# Patient Record
Sex: FEMALE | Race: BLACK OR AFRICAN AMERICAN | NOT HISPANIC OR LATINO | Employment: OTHER | URBAN - METROPOLITAN AREA
[De-identification: names, ages, dates, MRNs, and addresses within clinical notes are randomized per-mention and may not be internally consistent; named-entity substitution may affect disease eponyms.]

---

## 2023-12-01 ENCOUNTER — HOSPITAL ENCOUNTER (EMERGENCY)
Facility: HOSPITAL | Age: 63
Discharge: HOME | End: 2023-12-01
Payer: COMMERCIAL

## 2023-12-01 ENCOUNTER — APPOINTMENT (OUTPATIENT)
Dept: RADIOLOGY | Facility: HOSPITAL | Age: 63
End: 2023-12-01
Payer: COMMERCIAL

## 2023-12-01 VITALS
RESPIRATION RATE: 16 BRPM | BODY MASS INDEX: 40.72 KG/M2 | HEIGHT: 59 IN | OXYGEN SATURATION: 96 % | TEMPERATURE: 97.7 F | WEIGHT: 202 LBS | DIASTOLIC BLOOD PRESSURE: 67 MMHG | SYSTOLIC BLOOD PRESSURE: 135 MMHG | HEART RATE: 56 BPM

## 2023-12-01 DIAGNOSIS — R10.2 PELVIC PAIN: Primary | ICD-10-CM

## 2023-12-01 DIAGNOSIS — D21.9 FIBROID: ICD-10-CM

## 2023-12-01 LAB
ALBUMIN SERPL BCP-MCNC: 4.1 G/DL (ref 3.4–5)
ALP SERPL-CCNC: 85 U/L (ref 33–136)
ALT SERPL W P-5'-P-CCNC: 21 U/L (ref 7–45)
ANION GAP SERPL CALC-SCNC: 13 MMOL/L (ref 10–20)
APPEARANCE UR: CLEAR
AST SERPL W P-5'-P-CCNC: 18 U/L (ref 9–39)
BASOPHILS # BLD AUTO: 0.06 X10*3/UL (ref 0–0.1)
BASOPHILS NFR BLD AUTO: 0.7 %
BILIRUB SERPL-MCNC: 0.3 MG/DL (ref 0–1.2)
BILIRUB UR STRIP.AUTO-MCNC: NEGATIVE MG/DL
BUN SERPL-MCNC: 12 MG/DL (ref 6–23)
CALCIUM SERPL-MCNC: 9.4 MG/DL (ref 8.6–10.6)
CHLORIDE SERPL-SCNC: 102 MMOL/L (ref 98–107)
CO2 SERPL-SCNC: 27 MMOL/L (ref 21–32)
COLOR UR: YELLOW
CREAT SERPL-MCNC: 0.87 MG/DL (ref 0.5–1.05)
EOSINOPHIL # BLD AUTO: 0.46 X10*3/UL (ref 0–0.7)
EOSINOPHIL NFR BLD AUTO: 5.1 %
ERYTHROCYTE [DISTWIDTH] IN BLOOD BY AUTOMATED COUNT: 13.2 % (ref 11.5–14.5)
GFR SERPL CREATININE-BSD FRML MDRD: 75 ML/MIN/1.73M*2
GLUCOSE SERPL-MCNC: 160 MG/DL (ref 74–99)
GLUCOSE UR STRIP.AUTO-MCNC: NEGATIVE MG/DL
HCT VFR BLD AUTO: 43.3 % (ref 36–46)
HGB BLD-MCNC: 14 G/DL (ref 12–16)
HOLD SPECIMEN: NORMAL
IMM GRANULOCYTES # BLD AUTO: 0.02 X10*3/UL (ref 0–0.7)
IMM GRANULOCYTES NFR BLD AUTO: 0.2 % (ref 0–0.9)
KETONES UR STRIP.AUTO-MCNC: NEGATIVE MG/DL
LEUKOCYTE ESTERASE UR QL STRIP.AUTO: NEGATIVE
LYMPHOCYTES # BLD AUTO: 2.67 X10*3/UL (ref 1.2–4.8)
LYMPHOCYTES NFR BLD AUTO: 29.7 %
MCH RBC QN AUTO: 30.2 PG (ref 26–34)
MCHC RBC AUTO-ENTMCNC: 32.3 G/DL (ref 32–36)
MCV RBC AUTO: 93 FL (ref 80–100)
MONOCYTES # BLD AUTO: 0.57 X10*3/UL (ref 0.1–1)
MONOCYTES NFR BLD AUTO: 6.3 %
NEUTROPHILS # BLD AUTO: 5.2 X10*3/UL (ref 1.2–7.7)
NEUTROPHILS NFR BLD AUTO: 58 %
NITRITE UR QL STRIP.AUTO: NEGATIVE
NRBC BLD-RTO: 0 /100 WBCS (ref 0–0)
PH UR STRIP.AUTO: 5 [PH]
PLATELET # BLD AUTO: 247 X10*3/UL (ref 150–450)
POTASSIUM SERPL-SCNC: 4 MMOL/L (ref 3.5–5.3)
PROT SERPL-MCNC: 7.5 G/DL (ref 6.4–8.2)
PROT UR STRIP.AUTO-MCNC: NEGATIVE MG/DL
RBC # BLD AUTO: 4.64 X10*6/UL (ref 4–5.2)
RBC # UR STRIP.AUTO: NEGATIVE /UL
SODIUM SERPL-SCNC: 138 MMOL/L (ref 136–145)
SP GR UR STRIP.AUTO: 1.01
UROBILINOGEN UR STRIP.AUTO-MCNC: <2 MG/DL
WBC # BLD AUTO: 9 X10*3/UL (ref 4.4–11.3)

## 2023-12-01 PROCEDURE — 76856 US EXAM PELVIC COMPLETE: CPT | Performed by: RADIOLOGY

## 2023-12-01 PROCEDURE — 85025 COMPLETE CBC W/AUTO DIFF WBC: CPT

## 2023-12-01 PROCEDURE — 36415 COLL VENOUS BLD VENIPUNCTURE: CPT

## 2023-12-01 PROCEDURE — 2500000004 HC RX 250 GENERAL PHARMACY W/ HCPCS (ALT 636 FOR OP/ED)

## 2023-12-01 PROCEDURE — 99284 EMERGENCY DEPT VISIT MOD MDM: CPT

## 2023-12-01 PROCEDURE — 81003 URINALYSIS AUTO W/O SCOPE: CPT

## 2023-12-01 PROCEDURE — 80053 COMPREHEN METABOLIC PANEL: CPT

## 2023-12-01 PROCEDURE — 76856 US EXAM PELVIC COMPLETE: CPT

## 2023-12-01 PROCEDURE — 76830 TRANSVAGINAL US NON-OB: CPT | Performed by: RADIOLOGY

## 2023-12-01 PROCEDURE — 96375 TX/PRO/DX INJ NEW DRUG ADDON: CPT

## 2023-12-01 PROCEDURE — 96374 THER/PROPH/DIAG INJ IV PUSH: CPT

## 2023-12-01 RX ORDER — ONDANSETRON 4 MG/1
4 TABLET, ORALLY DISINTEGRATING ORAL EVERY 8 HOURS PRN
Qty: 21 TABLET | Refills: 0 | Status: SHIPPED | OUTPATIENT
Start: 2023-12-01

## 2023-12-01 RX ORDER — KETOROLAC TROMETHAMINE 15 MG/ML
15 INJECTION, SOLUTION INTRAMUSCULAR; INTRAVENOUS ONCE
Status: COMPLETED | OUTPATIENT
Start: 2023-12-01 | End: 2023-12-01

## 2023-12-01 RX ORDER — KETOROLAC TROMETHAMINE 10 MG/1
10 TABLET, FILM COATED ORAL EVERY 6 HOURS PRN
Qty: 20 TABLET | Refills: 0 | Status: SHIPPED | OUTPATIENT
Start: 2023-12-01 | End: 2023-12-06

## 2023-12-01 RX ORDER — ONDANSETRON HYDROCHLORIDE 2 MG/ML
4 INJECTION, SOLUTION INTRAVENOUS ONCE
Status: COMPLETED | OUTPATIENT
Start: 2023-12-01 | End: 2023-12-01

## 2023-12-01 RX ADMIN — ONDANSETRON 4 MG: 2 INJECTION INTRAMUSCULAR; INTRAVENOUS at 13:17

## 2023-12-01 RX ADMIN — KETOROLAC TROMETHAMINE 15 MG: 15 INJECTION INTRAMUSCULAR; INTRAVENOUS at 13:15

## 2023-12-01 ASSESSMENT — PAIN SCALES - GENERAL
PAINLEVEL_OUTOF10: 10 - WORST POSSIBLE PAIN
PAINLEVEL_OUTOF10: 10 - WORST POSSIBLE PAIN

## 2023-12-01 ASSESSMENT — PAIN DESCRIPTION - ONSET: ONSET: ONGOING

## 2023-12-01 ASSESSMENT — PAIN - FUNCTIONAL ASSESSMENT
PAIN_FUNCTIONAL_ASSESSMENT: 0-10
PAIN_FUNCTIONAL_ASSESSMENT: 0-10

## 2023-12-01 ASSESSMENT — PAIN DESCRIPTION - PROGRESSION: CLINICAL_PROGRESSION: NOT CHANGED

## 2023-12-01 ASSESSMENT — PAIN DESCRIPTION - FREQUENCY: FREQUENCY: CONSTANT/CONTINUOUS

## 2023-12-01 ASSESSMENT — PAIN DESCRIPTION - DESCRIPTORS: DESCRIPTORS: ACHING

## 2023-12-01 ASSESSMENT — COLUMBIA-SUICIDE SEVERITY RATING SCALE - C-SSRS
2. HAVE YOU ACTUALLY HAD ANY THOUGHTS OF KILLING YOURSELF?: NO
1. IN THE PAST MONTH, HAVE YOU WISHED YOU WERE DEAD OR WISHED YOU COULD GO TO SLEEP AND NOT WAKE UP?: NO
6. HAVE YOU EVER DONE ANYTHING, STARTED TO DO ANYTHING, OR PREPARED TO DO ANYTHING TO END YOUR LIFE?: NO

## 2023-12-01 ASSESSMENT — PAIN DESCRIPTION - PAIN TYPE: TYPE: ACUTE PAIN

## 2023-12-01 ASSESSMENT — PAIN DESCRIPTION - LOCATION: LOCATION: ABDOMEN

## 2023-12-01 NOTE — PROGRESS NOTES
Emergency Medicine Transition of Care Note.    I received Augustina Crump in signout from previous team.  Please see the previous ED provider note for all HPI, PE and MDM up to the time of signout at 1300. This is in addition to the primary record.    In brief Augustina Crump is an 63 y.o. female presenting for pelvic pain at the time of signout we were awaiting: Laboratory results, transvaginal ultrasound results and reevaluation.  On my reevaluation she was feeling improved following Zofran and Toradol.  Laboratory studies grossly unremarkable, specifically no evidence of UTI and patient was not anemic with her hemoglobin being 14.0.  Ultrasound did show an enlarged uterus with fibroids present as well as a thickened endometrium.  Patient does have an appointment scheduled with OB/GYN for a biopsy later this month.  She was informed of these results.  At this time I discussed with the patient that the biopsy would be the next step as there was no other acute findings noted on her imaging and laboratory results.  She was agreeable to this plan.  She was given prescriptions for Toradol and Zofran for her symptoms at home and was given signs and symptoms to return to the ED with.  She was advised to follow-up with her OB/GYN.    Diagnoses as of 12/01/23 1558   Pelvic pain   Fibroid       Labs Reviewed   COMPREHENSIVE METABOLIC PANEL - Abnormal       Result Value    Glucose 160 (*)     Sodium 138      Potassium 4.0      Chloride 102      Bicarbonate 27      Anion Gap 13      Urea Nitrogen 12      Creatinine 0.87      eGFR 75      Calcium 9.4      Albumin 4.1      Alkaline Phosphatase 85      Total Protein 7.5      AST 18      Bilirubin, Total 0.3      ALT 21     URINALYSIS WITH REFLEX MICROSCOPIC AND CULTURE - Normal    Color, Urine Yellow      Appearance, Urine Clear      Specific Gravity, Urine 1.014      pH, Urine 5.0      Protein, Urine NEGATIVE      Glucose, Urine NEGATIVE      Blood, Urine NEGATIVE       Ketones, Urine NEGATIVE      Bilirubin, Urine NEGATIVE      Urobilinogen, Urine <2.0      Nitrite, Urine NEGATIVE      Leukocyte Esterase, Urine NEGATIVE     URINALYSIS WITH REFLEX MICROSCOPIC AND CULTURE    Narrative:     The following orders were created for panel order Urinalysis with Reflex Microscopic and Culture.  Procedure                               Abnormality         Status                     ---------                               -----------         ------                     Urinalysis with Reflex M...[[id number]]  Normal              Final result               Extra Urine Gray Tube[[id number]]                            Final result                 Please view results for these tests on the individual orders.   CBC WITH AUTO DIFFERENTIAL    WBC 9.0      nRBC 0.0      RBC 4.64      Hemoglobin 14.0      Hematocrit 43.3      MCV 93      MCH 30.2      MCHC 32.3      RDW 13.2      Platelets 247      Neutrophils % 58.0      Immature Granulocytes %, Automated 0.2      Lymphocytes % 29.7      Monocytes % 6.3      Eosinophils % 5.1      Basophils % 0.7      Neutrophils Absolute 5.20      Immature Granulocytes Absolute, Automated 0.02      Lymphocytes Absolute 2.67      Monocytes Absolute 0.57      Eosinophils Absolute 0.46      Basophils Absolute 0.06     GREEN TOP    Extra Tube Hold for add-ons.     EXTRA URINE GRAY TUBE    Extra Tube Hold for add-ons.         US PELVIS TRANSABDOMINAL WITH TRANSVAGINAL   Final Result   1. Enlarged, leiomyomatous uterus including both intramural and   subserosal leiomyomas measuring up to 3.4 cm.   2. Small cystic lesions scattered throughout the uterine myometrium,   which likely represents adenomyosis.   3. Abnormally thickened endometrium given postmenopausal status.   Recommend further evaluation with direct visualization via   hysteroscopy and/or tissue sampling.        I personally reviewed the images/study, and I agree with the findings   as stated above. This study was  interpreted at [place], [place].        MACRO:   None        Signed by: Johnson De La Fuente 12/1/2023 3:31 PM   Dictation workstation:   [id number]            Medical Decision Making  Amount and/or Complexity of Data Reviewed  Labs: ordered.  Radiology: ordered and independent interpretation performed.    Risk  Prescription drug management.        Final diagnoses:   [R10.2] Pelvic pain   [D21.9] Fibroid           Procedure  Procedures    Loretta Dennison PA-C

## 2023-12-01 NOTE — ED TRIAGE NOTES
Pt has history of fibroid tumors. Is scheduled to have them removed at the end of December. Pt reports she has had increase in pain and vaginal bleeding.

## 2023-12-01 NOTE — ED PROVIDER NOTES
HPI   Chief Complaint   Patient presents with    Abdominal Pain       Patient is a 63-year-old female with a history of hypertension, diabetes, as well as recently diagnosed fibroids.  She presents to the emergency department with lower abdominal pain.  Patient states that about a month ago she started noticing she was having a period, which was abnormal, went to primary care provider/OB/GYN who diagnosed her with fibroids.  Patient states there was some concern of cancer, so she is waiting on biopsy results.  Has a follow-up at the end of December.  States that the bleeding has subsided and she is no longer experiencing any, but 2 weeks ago she started dealing with this lower abdominal pain, which does seem mainly left-sided, but does wrap around the lower abdomen.  Patient endorses some new or nausea, but no vomiting.  Endorses normal bowel movements and no dysuria.  No abdominal history surgically.  Denies any fevers, chills, weakness, dizziness, chest pain, shortness of breath, vaginal discharge, hematuria, diarrhea.                          [place] Coma Scale Score: 15                  Patient History   No past medical history on file.  No past surgical history on file.  No family history on file.  Social History     Tobacco Use    Smoking status: Not on file    Smokeless tobacco: Not on file   Substance Use Topics    Alcohol use: Not on file    Drug use: Not on file       Physical Exam   ED Triage Vitals [12/01/23 1222]   Temp Heart Rate Resp BP   36.5 °C (97.7 °F) 56 16 135/67      SpO2 Temp Source Heart Rate Source Patient Position   96 % Tympanic -- --      BP Location FiO2 (%)     Right arm --       Physical Exam  Vitals and nursing note reviewed.   Constitutional:       General: She is not in acute distress.     Appearance: She is well-developed.   HENT:      Head: Normocephalic and atraumatic.   Eyes:      Conjunctiva/sclera: Conjunctivae normal.   Cardiovascular:      Rate and Rhythm: Normal rate and  regular rhythm.      Heart sounds: No murmur heard.  Pulmonary:      Effort: Pulmonary effort is normal. No respiratory distress.      Breath sounds: Normal breath sounds.   Abdominal:      Palpations: Abdomen is soft.      Tenderness: There is abdominal tenderness in the suprapubic area and left lower quadrant. There is no right CVA tenderness, left CVA tenderness, guarding or rebound. Negative signs include Rovsing's sign, McBurney's sign and psoas sign.      Hernia: No hernia is present.   Musculoskeletal:         General: No swelling.      Cervical back: Neck supple.   Skin:     General: Skin is warm and dry.      Capillary Refill: Capillary refill takes less than 2 seconds.   Neurological:      Mental Status: She is alert.   Psychiatric:         Mood and Affect: Mood normal.         ED Course & MDM        Medical Decision Making  Patient presents with lower abdominal pain for approximately 2 weeks.  Does have a GYN history, with concerns of possible fibroids versus tumors.  On exam patient is very well-appearing, afebrile, hemodynamically stable.  Patient endorses nausea but no vomiting.  No surgical abdominal history.  Patient's abdomen is slightly tender to palpation in the lower abdominal regions with most of it in the left lower quadrant as well as the suprapubic region.  No right lower quadrant tenderness is appreciated.  Patient's abdomen is soft, low suspicion for any acute surgical abdomen.  Patient looks very well, and has no guarding.  Patient eating and drinking without difficulties.  Patient denies any vaginal discharge or vaginal bleeding.  Due to my history and physical exam, I do want to rule out possible ovarian torsion as the patient endorses nausea and left-sided pain although with the symptoms going on 2 weeks, have lower suspicion.  Will get a transvaginal ultrasound to also investigate possible fibroids or worsening pathology from previous.  Patient is afebrile and has no right lower  quadrant/McBurney's tenderness, so lower suspicion for appendicitis.  Patient is slightly tender to the left lower quadrant as well, so do not want to discount diverticulitis at this time.  The plan will be transvaginal ultrasound and also get CBC and CMP and urinalysis.  However, with no CVA tenderness, and no real urinary symptoms, low suspicion for pyelonephritis.  Treated patient's pain with Toradol.  If transvaginal ultrasound is unremarkable and patient's pain persists, will reevaluate for possible CT to look for abdominal pathology.    Of note patient did asked to be admitted for hysterectomy, but I explained to her that she would have to continue to follow-up with outpatient OB/GYN as this is not a common practice to admit someone for from the emergency department unless we do find an emergent reason for admitting the patient. However, we will wait to make a decision on that obviously until work up is complete.     At the end of my shift the patient was still awaiting her blood work as well as imaging, due to this the patient was signed out to the oncoming physician assistant Loretta Dennison for reassessment and disposition.        Procedure  Procedures     Brent Moore PA-C  12/01/23 8471

## 2023-12-13 LAB
HOLD SPECIMEN: NORMAL

## 2024-04-05 ENCOUNTER — OFFICE VISIT (OUTPATIENT)
Dept: ORTHOPEDIC SURGERY | Facility: HOSPITAL | Age: 64
End: 2024-04-05
Payer: COMMERCIAL

## 2024-04-05 ENCOUNTER — HOSPITAL ENCOUNTER (OUTPATIENT)
Dept: RADIOLOGY | Facility: HOSPITAL | Age: 64
Discharge: HOME | End: 2024-04-05
Payer: COMMERCIAL

## 2024-04-05 VITALS — WEIGHT: 202 LBS | HEIGHT: 59 IN | BODY MASS INDEX: 40.72 KG/M2

## 2024-04-05 DIAGNOSIS — M25.561 RIGHT KNEE PAIN, UNSPECIFIED CHRONICITY: Primary | ICD-10-CM

## 2024-04-05 DIAGNOSIS — M25.561 RIGHT KNEE PAIN, UNSPECIFIED CHRONICITY: ICD-10-CM

## 2024-04-05 PROCEDURE — 2500000004 HC RX 250 GENERAL PHARMACY W/ HCPCS (ALT 636 FOR OP/ED): Performed by: PHYSICIAN ASSISTANT

## 2024-04-05 PROCEDURE — 73560 X-RAY EXAM OF KNEE 1 OR 2: CPT | Mod: 50

## 2024-04-05 PROCEDURE — 99204 OFFICE O/P NEW MOD 45 MIN: CPT | Performed by: PHYSICIAN ASSISTANT

## 2024-04-05 PROCEDURE — 99214 OFFICE O/P EST MOD 30 MIN: CPT | Performed by: PHYSICIAN ASSISTANT

## 2024-04-05 PROCEDURE — 73560 X-RAY EXAM OF KNEE 1 OR 2: CPT | Mod: RT

## 2024-04-05 PROCEDURE — 73560 X-RAY EXAM OF KNEE 1 OR 2: CPT | Mod: BILATERAL PROCEDURE | Performed by: RADIOLOGY

## 2024-04-05 PROCEDURE — 2500000005 HC RX 250 GENERAL PHARMACY W/O HCPCS: Performed by: PHYSICIAN ASSISTANT

## 2024-04-05 PROCEDURE — 20610 DRAIN/INJ JOINT/BURSA W/O US: CPT | Performed by: PHYSICIAN ASSISTANT

## 2024-04-05 RX ORDER — INSULIN GLARGINE 100 [IU]/ML
INJECTION, SOLUTION SUBCUTANEOUS
COMMUNITY
Start: 2023-06-14

## 2024-04-05 RX ORDER — LISINOPRIL AND HYDROCHLOROTHIAZIDE 10; 12.5 MG/1; MG/1
TABLET ORAL
COMMUNITY
Start: 2023-06-14

## 2024-04-05 RX ORDER — FLASH GLUCOSE SENSOR
KIT MISCELLANEOUS
COMMUNITY
Start: 2024-03-20

## 2024-04-05 RX ORDER — FLUTICASONE PROPIONATE 50 MCG
1 SPRAY, SUSPENSION (ML) NASAL
COMMUNITY
Start: 2022-09-16

## 2024-04-05 RX ORDER — SEMAGLUTIDE 2.68 MG/ML
INJECTION, SOLUTION SUBCUTANEOUS
COMMUNITY
Start: 2023-06-14

## 2024-04-05 RX ORDER — OMEPRAZOLE 20 MG/1
CAPSULE, DELAYED RELEASE ORAL
COMMUNITY
Start: 2023-06-21

## 2024-04-05 RX ORDER — ATORVASTATIN CALCIUM 40 MG/1
TABLET, FILM COATED ORAL
COMMUNITY
Start: 2023-06-14

## 2024-04-05 RX ORDER — TRIAMCINOLONE ACETONIDE 40 MG/ML
40 INJECTION, SUSPENSION INTRA-ARTICULAR; INTRAMUSCULAR
Status: COMPLETED | OUTPATIENT
Start: 2024-04-05 | End: 2024-04-05

## 2024-04-05 RX ORDER — LEVOTHYROXINE SODIUM 100 UG/1
TABLET ORAL
COMMUNITY
Start: 2023-03-07

## 2024-04-05 RX ORDER — BACLOFEN 10 MG/1
TABLET ORAL
COMMUNITY
Start: 2022-09-16

## 2024-04-05 RX ORDER — LIDOCAINE HYDROCHLORIDE 10 MG/ML
4 INJECTION INFILTRATION; PERINEURAL
Status: COMPLETED | OUTPATIENT
Start: 2024-04-05 | End: 2024-04-05

## 2024-04-05 RX ORDER — GABAPENTIN 300 MG/1
CAPSULE ORAL
COMMUNITY
Start: 2023-06-21

## 2024-04-05 RX ADMIN — TRIAMCINOLONE ACETONIDE 40 MG: 400 INJECTION, SUSPENSION INTRA-ARTICULAR; INTRAMUSCULAR at 13:56

## 2024-04-05 RX ADMIN — LIDOCAINE HYDROCHLORIDE 4 ML: 10 INJECTION, SOLUTION INFILTRATION; PERINEURAL at 13:56

## 2024-04-05 ASSESSMENT — PAIN DESCRIPTION - DESCRIPTORS: DESCRIPTORS: THROBBING

## 2024-04-05 ASSESSMENT — PAIN - FUNCTIONAL ASSESSMENT: PAIN_FUNCTIONAL_ASSESSMENT: 0-10

## 2024-04-05 ASSESSMENT — PAIN SCALES - GENERAL: PAINLEVEL_OUTOF10: 10 - WORST POSSIBLE PAIN

## 2024-04-05 NOTE — PROGRESS NOTES
History of Present Illness  Augustina Crump is a 63 y.o.s female presenting for evaluation of side: right knee pain for the past 2 weeks. Sx started with changing position and localized to right lateral, anterior. Described as moderate. Since increased with any weight bearing. Improved with relaxation. has tried Tylenol used and beneficial. has had similar sx in the past which resolved with cortisone shot. has tried therapy for the pain.  Denies trauma/fall. Denies numbness, tingling, f/c, n/v, CP, SOB, or any other complaints/concerns.      No past medical history on file.    Medication Documentation Review Audit       Reviewed by Anastacia Licona MA (Medical Assistant) on 04/05/24 at 0925      Medication Order Taking? Sig Documenting Provider Last Dose Status   atorvastatin (Lipitor) 40 mg tablet [id number] Yes Take 1 tablet by mouth daily Oral for 90 Days Frank Naranjo MD Taking Active   baclofen (Lioresal) 10 mg tablet [id number] Yes Take 1 tablet by mouth daily Oral for 90 Days Frank Naranjo MD Taking Active   fluticasone (Flonase) 50 mcg/actuation nasal spray [id number] Yes 1 spray by Does not apply route once daily. Historical MD Marcella Taking Active   FreeStyle Sin 2 Sensor kit [id number] Yes check blood glcose BEFORE meals AND AT bedtime daily Historical MD Marcella Taking Active   gabapentin (Neurontin) 300 mg capsule [id number] Yes take one three times a day Oral for 30 Days Historical MD Marcella Taking Active   Lantus Solostar U-100 Insulin 100 unit/mL (3 mL) pen [id number] Yes INJECT 20 units SUBCUTANEOUSLY AT BEDTIME Subcutaneous for 75 Days Historical MD Marcella Taking Active   levothyroxine (Synthroid, Levoxyl) 100 mcg tablet [id number] Yes TAKE ONE TABLET BY MOUTH EVERY DAY Oral for 90 Days Frank Naranjo MD Taking Active   lisinopriL-hydrochlorothiazide 10-12.5 mg tablet [id number] Yes TAKE ONE TABLET BY MOUTH EVERY DAY Oral for 90 Days Historical MD Marcella Taking Active    omeprazole (PriLOSEC) 20 mg DR capsule [id number] Yes Take 1 capsule by mouth daily Oral for 90 Days Historical Provider, MD Taking Active   ondansetron ODT (Zofran-ODT) 4 mg disintegrating tablet [id number] No Take 1 tablet (4 mg) by mouth every 8 hours if needed for nausea or vomiting.   Patient not taking: Reported on 4/5/2024    Loretta Dennison PA-C Not Taking Flag for Review   Ozempic 2 mg/dose (8 mg/3 mL) pen injector [id number] Yes INJECT TWO MG ONCE WEEKLY Subcutaneous for 30 Days Historical Provider, MD Taking Active                    Allergies   Allergen Reactions    Codeine Hives and Rash       Social History     Socioeconomic History    Marital status: Single     Spouse name: Not on file    Number of children: Not on file    Years of education: Not on file    Highest education level: Not on file   Occupational History    Not on file   Tobacco Use    Smoking status: Not on file    Smokeless tobacco: Not on file   Substance and Sexual Activity    Alcohol use: Not on file    Drug use: Not on file    Sexual activity: Not on file   Other Topics Concern    Not on file   Social History Narrative    Not on file     Social Determinants of Health     Financial Resource Strain: Not on file   Food Insecurity: Not on file   Transportation Needs: Not on file   Physical Activity: Not on file   Stress: Not on file   Social Connections: Not on file   Intimate Partner Violence: Not on file   Housing Stability: Not on file       No past surgical history on file.     Review of Systems   30 point ROS reviewed and negative other than as listed in the HPI.      Exam  Gen: The pt is A&Ox3, NAD, and appear state age and weight  Psychiatric: mood and affect are appropriate   Eyes: sclera are white, EOM grossly intact  ENT: MMM  Neck: supple, thyroid is midline  Respiratory: respirations are nonlabored, chest rise symmetric  CV: rate is regular by palpation of distal pulses  Abdomen: nondistended   Integument: no obvious  cutaneous lesions noted. No signs of lymphangitis. No signs of systemic edema.   MSK:  Knee Musculoskeletal Exam  Gait    Antalgic: right    Inspection    Right      Right knee inspection is normal.      Palpation    Right      Increased warmth: none      Masses: none        Crepitus: none        Tenderness: present          Lateral joint line: moderate      Range of Motion    Right      Right knee range of motion is normal.      Strength    Right      Right knee strength is normal.      Instability    Right      Instability signs: none - stable      Imaging:  I personally reviewed multiple views of the right knee were obtained in the office today demonstrate moderate to advance OA changes; no acute fx     Assessment:  right DJD    Plan:  We discussed conservative treatment with Natural history and expected course discussed. Questions answered.  Educational materials distributed.  Rest, ice, compression, and elevation (RICE) therapy.  Has had therapy in the past and will resume her exercises      We discussed conservative treatment with steroid injection. See procedure note. L Inj/Asp: R knee on 4/5/2024 1:56 PM  Indications: pain  Details: 21 G needle, anteromedial approach  Medications: 4 mL lidocaine 10 mg/mL (1 %); 40 mg triamcinolone acetonide 40 mg/mL  Outcome: tolerated well, no immediate complications  Procedure, treatment alternatives, risks and benefits explained, specific risks discussed. Consent was given by the patient. Immediately prior to procedure a time out was called to verify the correct patient, procedure, equipment, support staff and site/side marked as required. Patient was prepped and draped in the usual sterile fashion.            Follow up with joints provider in 3 months    Natural history reviewed. All of the pt questions/concerns addressed and they are in agreement with the plan.